# Patient Record
Sex: FEMALE | Race: OTHER
[De-identification: names, ages, dates, MRNs, and addresses within clinical notes are randomized per-mention and may not be internally consistent; named-entity substitution may affect disease eponyms.]

---

## 2018-05-11 PROBLEM — Z00.00 ENCOUNTER FOR PREVENTIVE HEALTH EXAMINATION: Status: ACTIVE | Noted: 2018-05-11

## 2018-05-14 ENCOUNTER — APPOINTMENT (OUTPATIENT)
Dept: SURGERY | Facility: CLINIC | Age: 63
End: 2018-05-14
Payer: MEDICAID

## 2018-05-14 VITALS
HEIGHT: 64 IN | BODY MASS INDEX: 25.27 KG/M2 | TEMPERATURE: 98.3 F | DIASTOLIC BLOOD PRESSURE: 84 MMHG | HEART RATE: 83 BPM | SYSTOLIC BLOOD PRESSURE: 146 MMHG | WEIGHT: 148 LBS

## 2018-05-14 PROCEDURE — 99203 OFFICE O/P NEW LOW 30 MIN: CPT

## 2018-05-25 ENCOUNTER — OUTPATIENT (OUTPATIENT)
Dept: OUTPATIENT SERVICES | Facility: HOSPITAL | Age: 63
LOS: 1 days | End: 2018-05-25
Payer: MEDICAID

## 2018-05-25 VITALS
SYSTOLIC BLOOD PRESSURE: 133 MMHG | DIASTOLIC BLOOD PRESSURE: 69 MMHG | OXYGEN SATURATION: 98 % | RESPIRATION RATE: 16 BRPM | HEART RATE: 68 BPM | WEIGHT: 145.06 LBS | HEIGHT: 62 IN | TEMPERATURE: 98 F

## 2018-05-25 DIAGNOSIS — D36.7 BENIGN NEOPLASM OF OTHER SPECIFIED SITES: ICD-10-CM

## 2018-05-25 DIAGNOSIS — N60.01 SOLITARY CYST OF RIGHT BREAST: ICD-10-CM

## 2018-05-25 DIAGNOSIS — Z01.818 ENCOUNTER FOR OTHER PREPROCEDURAL EXAMINATION: ICD-10-CM

## 2018-05-25 PROCEDURE — G0463: CPT

## 2018-05-25 NOTE — H&P PST ADULT - FAMILY HISTORY
Father  Still living? Unknown  Family history of pancreatitis, Age at diagnosis: Age Unknown     Sibling  Still living? Unknown  Family history of cancer, Age at diagnosis: Age Unknown

## 2018-05-25 NOTE — H&P PST ADULT - HISTORY OF PRESENT ILLNESS
62year old female with pmhx of endometriosis presents today with c/o painless right thigh and right breast mass x 1year. Patient denies any pain or fever to areas. Patient is here today for presurgical testing for scheduled excision of right breast mass and right thigh mass on 5/30/18

## 2018-05-29 ENCOUNTER — RESULT REVIEW (OUTPATIENT)
Age: 63
End: 2018-05-29

## 2018-05-29 ENCOUNTER — TRANSCRIPTION ENCOUNTER (OUTPATIENT)
Age: 63
End: 2018-05-29

## 2018-05-30 ENCOUNTER — OUTPATIENT (OUTPATIENT)
Dept: OUTPATIENT SERVICES | Facility: HOSPITAL | Age: 63
LOS: 1 days | End: 2018-05-30
Payer: MEDICAID

## 2018-05-30 VITALS
DIASTOLIC BLOOD PRESSURE: 69 MMHG | HEART RATE: 78 BPM | TEMPERATURE: 98 F | SYSTOLIC BLOOD PRESSURE: 133 MMHG | WEIGHT: 145.06 LBS | RESPIRATION RATE: 16 BRPM | OXYGEN SATURATION: 98 % | HEIGHT: 62 IN

## 2018-05-30 VITALS
OXYGEN SATURATION: 99 % | HEART RATE: 66 BPM | RESPIRATION RATE: 17 BRPM | DIASTOLIC BLOOD PRESSURE: 58 MMHG | TEMPERATURE: 98 F | SYSTOLIC BLOOD PRESSURE: 108 MMHG

## 2018-05-30 DIAGNOSIS — Z01.818 ENCOUNTER FOR OTHER PREPROCEDURAL EXAMINATION: ICD-10-CM

## 2018-05-30 DIAGNOSIS — D36.7 BENIGN NEOPLASM OF OTHER SPECIFIED SITES: ICD-10-CM

## 2018-05-30 DIAGNOSIS — N60.01 SOLITARY CYST OF RIGHT BREAST: ICD-10-CM

## 2018-05-30 PROCEDURE — 12032 INTMD RPR S/A/T/EXT 2.6-7.5: CPT

## 2018-05-30 PROCEDURE — 88305 TISSUE EXAM BY PATHOLOGIST: CPT | Mod: 26

## 2018-05-30 PROCEDURE — 88304 TISSUE EXAM BY PATHOLOGIST: CPT | Mod: 26

## 2018-05-30 PROCEDURE — 88305 TISSUE EXAM BY PATHOLOGIST: CPT

## 2018-05-30 PROCEDURE — 88304 TISSUE EXAM BY PATHOLOGIST: CPT

## 2018-05-30 PROCEDURE — 27327 EXC THIGH/KNEE LES SC < 3 CM: CPT | Mod: RT

## 2018-05-30 PROCEDURE — 19120 REMOVAL OF BREAST LESION: CPT | Mod: RT

## 2018-05-30 PROCEDURE — 11402 EXC TR-EXT B9+MARG 1.1-2 CM: CPT | Mod: XS

## 2018-05-30 RX ORDER — FENTANYL CITRATE 50 UG/ML
25 INJECTION INTRAVENOUS
Qty: 0 | Refills: 0 | Status: DISCONTINUED | OUTPATIENT
Start: 2018-05-30 | End: 2018-05-30

## 2018-05-30 RX ORDER — ONDANSETRON 8 MG/1
4 TABLET, FILM COATED ORAL ONCE
Qty: 0 | Refills: 0 | Status: DISCONTINUED | OUTPATIENT
Start: 2018-05-30 | End: 2018-05-30

## 2018-05-30 RX ORDER — SODIUM CHLORIDE 9 MG/ML
3 INJECTION INTRAMUSCULAR; INTRAVENOUS; SUBCUTANEOUS EVERY 8 HOURS
Qty: 0 | Refills: 0 | Status: DISCONTINUED | OUTPATIENT
Start: 2018-05-30 | End: 2018-05-30

## 2018-05-30 RX ORDER — ACETAMINOPHEN WITH CODEINE 300MG-30MG
1 TABLET ORAL
Qty: 10 | Refills: 0 | OUTPATIENT
Start: 2018-05-30

## 2018-05-30 RX ORDER — ACETAMINOPHEN WITH CODEINE 300MG-30MG
1 TABLET ORAL EVERY 6 HOURS
Qty: 0 | Refills: 0 | Status: DISCONTINUED | OUTPATIENT
Start: 2018-05-30 | End: 2018-05-30

## 2018-05-30 RX ADMIN — SODIUM CHLORIDE 3 MILLILITER(S): 9 INJECTION INTRAMUSCULAR; INTRAVENOUS; SUBCUTANEOUS at 08:17

## 2018-05-30 NOTE — ASU DISCHARGE PLAN (ADULT/PEDIATRIC). - NOTIFY
Fever greater than 101/Bleeding that does not stop/Swelling that continues Pain not relieved by Medications/Bleeding that does not stop/Fever greater than 101/Swelling that continues

## 2018-05-30 NOTE — ASU DISCHARGE PLAN (ADULT/PEDIATRIC). - MEDICATION SUMMARY - MEDICATIONS TO TAKE
I will START or STAY ON the medications listed below when I get home from the hospital:    acetaminophen-codeine 300 mg-30 mg oral tablet  -- 1 tab(s) by mouth every 6 hours, As needed, Moderate Pain (4 - 6) MDD:4  -- Indication: For Moderate pain

## 2018-05-30 NOTE — BRIEF OPERATIVE NOTE - PROCEDURE
<<-----Click on this checkbox to enter Procedure Excision of breast mass  05/30/2018    Active  [facility]  Excision of right thigh mass  05/30/2018    Active  [facility]

## 2018-05-30 NOTE — ASU DISCHARGE PLAN (ADULT/PEDIATRIC). - CONDITIONS AT DISCHARGE
pt feels comfortable. denies pain. No active bleeding. Tolerating diet. Vital signs stable. Dressing dry, clean, and intact. Fully awake, alert and oriented x 4 stable

## 2018-05-30 NOTE — BRIEF OPERATIVE NOTE - PRE-OP DX
Breast mass, right  05/30/2018    Active  Andrey Leroy  Mass of thigh, right  05/30/2018    Active  Andrey Leroy

## 2018-05-31 NOTE — BRIEF OPERATIVE NOTE - OPERATION/FINDINGS
Can add an extra vit B12 250mcg daily. rec she see neurology for her dizziness. No medicine can be given as they cause fatigue and she already has fatigue.   Wound class 1

## 2018-06-01 LAB — SURGICAL PATHOLOGY FINAL REPORT - CH: SIGNIFICANT CHANGE UP

## 2018-06-12 PROBLEM — Z87.42 HISTORY OF OVARIAN CYST: Status: RESOLVED | Noted: 2018-06-12 | Resolved: 2018-06-12

## 2018-06-12 PROBLEM — Z83.79 FAMILY HISTORY OF PANCREATITIS: Status: ACTIVE | Noted: 2018-06-12

## 2018-06-12 PROBLEM — R22.41 MASS OF RIGHT THIGH: Status: RESOLVED | Noted: 2018-06-12 | Resolved: 2018-06-12

## 2018-06-18 ENCOUNTER — APPOINTMENT (OUTPATIENT)
Dept: SURGERY | Facility: CLINIC | Age: 63
End: 2018-06-18
Payer: MEDICAID

## 2018-06-18 DIAGNOSIS — Z87.42 PERSONAL HISTORY OF OTHER DISEASES OF THE FEMALE GENITAL TRACT: ICD-10-CM

## 2018-06-18 DIAGNOSIS — R22.41 LOCALIZED SWELLING, MASS AND LUMP, RIGHT LOWER LIMB: ICD-10-CM

## 2018-06-18 DIAGNOSIS — Z83.79 FAMILY HISTORY OF OTHER DISEASES OF THE DIGESTIVE SYSTEM: ICD-10-CM

## 2018-06-18 DIAGNOSIS — R22.2 LOCALIZED SWELLING, MASS AND LUMP, TRUNK: ICD-10-CM

## 2018-06-18 PROCEDURE — 99024 POSTOP FOLLOW-UP VISIT: CPT

## 2020-04-02 PROBLEM — R22.2 CHEST WALL MASS: Status: RESOLVED | Noted: 2018-06-12 | Resolved: 2020-04-02

## 2021-01-04 PROBLEM — N80.9 ENDOMETRIOSIS, UNSPECIFIED: Chronic | Status: ACTIVE | Noted: 2018-05-25

## 2021-01-05 PROBLEM — Z12.39 SCREENING BREAST EXAMINATION: Status: ACTIVE | Noted: 2021-01-05

## 2021-01-05 NOTE — HISTORY OF PRESENT ILLNESS
[de-identified] : This is  a 65 year   old patient presenting today for a breast exam and to discuss the results of her recent  breast imaging. Patient had a    BL breast Mammogram on 10/19/2020 Deemed BIRADS category  1.   \par \par PERTINENT HISTORY: \par Menarche at age 12\par No pregnancies\par Menopause age 46. \par

## 2021-01-05 NOTE — DATA REVIEWED
[FreeTextEntry1] : Exam requested by:\par KP COCHRAN MD\par [address]\par [facility] [address]\par SITE PERFORMED: [facility] SITE PHONE: [phone]\par Patient: JUSTIN HILLIARD\par YOB: 1955\par Phone: [phone]\par MRN: [id number] Acc: [id number]\par Date of Exam: 10-\par  \par EXAM: DIGITAL BILATERAL SCREENING MAMMOGRAM WITH CAD AND TOMOSYNTHESIS\par \par HISTORY: The patient is 64 years old and is seen for a screening mammogram. There is no personal history of breast cancer. Family history of breast cancer: Patient's sister diagnosed at age 64.\par \par CLINICAL BREAST EXAMINATION: The patient states that she has not had a recent clinical breast exam. \par \par TECHNIQUE: The following views were obtained digitally: bilateral craniocaudal, bilateral mediolateral oblique. Low-dose full-field digital breast tomosynthesis examination was performed with 3D acquisitions and C-View synthesized 2D reconstructed images. Computer-aided detection (CAD) was utilized. \par \par COMPARISON: The present examination has been compared to prior breast imaging studies dating back to 6/23/2016.\par \par FINDINGS:\par BREAST COMPOSITION: The breasts are heterogeneously dense, which may obscure small masses.\par \par No suspicious masses, architectural distortion, or significant calcifications are detected.\par \par IMPRESSION: No mammographic evidence of malignancy. \par \par FOLLOW-UP: Annual screening.\par Screening breast ultrasound may be a helpful supplement to mammography in this patient with dense breast tissue. A screening breast ultrasound would require your prescription. \par \par ASSESSMENT: BI-RADS Category 1:  Negative.\par \par This examination should not preclude the clinical evaluation of a suspicious palpable abnormality. \par \par As per the FDA requirements, a layman's letter has been generated and sent to your patient stating the results and recommendations of this breast imaging study. We have entered your patient into our reminder system and will notify them when they are due for their next breast imaging exam. \par \par Thank you for the opportunity to participate in the care of this patient.  \par  \par Armando Rouse MD  - Electronically Signed: 10- 11:46 AM \par Physician to Physician Direct Line is: [phone]\par Copy to:JOY BE MD\par

## 2021-01-11 ENCOUNTER — APPOINTMENT (OUTPATIENT)
Dept: SURGERY | Facility: CLINIC | Age: 66
End: 2021-01-11

## 2021-01-11 DIAGNOSIS — Z12.39 ENCOUNTER FOR OTHER SCREENING FOR MALIGNANT NEOPLASM OF BREAST: ICD-10-CM

## 2024-01-09 ENCOUNTER — APPOINTMENT (OUTPATIENT)
Dept: OTOLARYNGOLOGY | Facility: CLINIC | Age: 69
End: 2024-01-09
Payer: MEDICARE

## 2024-01-09 VITALS — WEIGHT: 142 LBS | HEIGHT: 64 IN | BODY MASS INDEX: 24.24 KG/M2

## 2024-01-09 DIAGNOSIS — H90.3 SENSORINEURAL HEARING LOSS, BILATERAL: ICD-10-CM

## 2024-01-09 DIAGNOSIS — K21.9 GASTRO-ESOPHAGEAL REFLUX DISEASE W/OUT ESOPHAGITIS: ICD-10-CM

## 2024-01-09 DIAGNOSIS — H92.01 OTALGIA, RIGHT EAR: ICD-10-CM

## 2024-01-09 DIAGNOSIS — M26.609 UNSPECIFIED TEMPOROMANDIBULAR JOINT DISORDER: ICD-10-CM

## 2024-01-09 DIAGNOSIS — H69.91 UNSPECIFIED EUSTACHIAN TUBE DISORDER, RIGHT EAR: ICD-10-CM

## 2024-01-09 PROCEDURE — 31575 DIAGNOSTIC LARYNGOSCOPY: CPT

## 2024-01-09 PROCEDURE — 99204 OFFICE O/P NEW MOD 45 MIN: CPT | Mod: 25

## 2024-01-09 PROCEDURE — 92567 TYMPANOMETRY: CPT

## 2024-01-09 PROCEDURE — 92557 COMPREHENSIVE HEARING TEST: CPT

## 2024-01-09 NOTE — ASSESSMENT
[FreeTextEntry1] : She has a history of right ear pressure and pain.  She was found to have remodeling/defect in the right posterior canal wall. CT scan report was reviewed. There was no cholesteatoma. 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   Audiogram shows a bilateral SNHL  She has TMJD which is likely causing the ear pain. She may also have eustachian tube dysfunction, Flexible laryngoscopy showed laryngeal changes consistent with reflux. there were no lesions  Plan -findings and management options discussed with the patient. -good ear hygiene reviewed -avoid using cotton swabs in the ears -monitor hearing -consider hearing aid evaluation -reflux precautions. she was given literature -if she has nasal congestion, she may try a nasal steroid spray -treatment for TMJD recommended -we will monitor her ear closely. if she develops recurrent ear infections or there are changes in her exam, she may require surgery. However,  there is no obvious cholesteatoma on exam or on CT scan -follow up in 3 months if doing well.  -she was told to call and return earlier if any problems

## 2024-01-09 NOTE — PHYSICAL EXAM
[TextEntry] : PHYSICAL EXAM  General: The patient was alert, oriented and in no distress. Voice was clear.  Face: The patient had no facial asymmetry or mass. The skin was unremarkable.  Ears: Hearing normal to conversational voice External ears were normal without deformity. Ear canals: AS-clear. No cerumen or inflammation. AD- evaluated under the microscope. remodeling and scalloping of the posterior canal wall. no cerumen, squame, debris or inflammation.   Tympanic membranes were intact and normal. No perforation or effusion. mobile  Nose:  The external nose had no significant deformity.  There was no facial tenderness.  On anterior rhinoscopy, the nasal mucosa was normal.   The anterior septum was grossly midline. There were no visualized polyps, purulence  or masses.   Oral cavity: Oral mucosa- normal. Oral and base of tongue- clear and without mass. Gingival and buccal mucosa- moist and without lesions. Palate- the palate moved well. There was no cleft palate. There appeared to be good salivary flow.   Oral cavity/oropharynx- no pus, erythema or mass  Neck:  The neck was symmetrical. The parotid and submandibular glands were normal without masses. The trachea was midline and there was no unusual crepitus. Thyroid was smooth and nontender and no masses were palpated. No masses  Lymphatics: Cervical adenopathy- none.

## 2024-01-09 NOTE — HISTORY OF PRESENT ILLNESS
[de-identified] : JUSTIN HILLIARD is a 68 year old patient here for ear evaluation. She saw an ENT in December for right ear and neck pain.  She had wax removed from the ears and was given antibiotics. The ear and neck pain improved., She was also sent for a CT scan of the temporal bones.  She has a long history of right tinnitus. She has ear pressure/eustachian tube dysfunction but no pain, otorrhea or dizziness,  she has no throat pain, dysphagia, nasal congestion or sinus symptoms.   no history of ear surgery she had tonsillectomy as a child no recurrent ear infections no ear trauma or noise exposure she has TMJD related to trauma in the past. She is getting a new nightguard she does not smoke she has no seasonal allergies  CT scan of temporal bones- bony defect of the superior margin of the right external audiotory canal approximately 1 cm likely due to sequela of ear canal cholesteatoma. there was possible continuity with the mastoid air cells.

## 2024-01-10 PROBLEM — H90.3 SENSORINEURAL HEARING LOSS (SNHL) OF BOTH EARS: Status: ACTIVE | Noted: 2024-01-09

## 2024-02-06 ENCOUNTER — APPOINTMENT (OUTPATIENT)
Dept: OTOLARYNGOLOGY | Facility: CLINIC | Age: 69
End: 2024-02-06

## 2024-05-30 PROBLEM — R92.8 ABNORMAL MAMMOGRAPHY: Status: ACTIVE | Noted: 2024-05-30

## 2024-05-30 NOTE — DATA REVIEWED
[FreeTextEntry1] : 	   KP COCHRAN MD [address] [facility] [address] SITE PERFORMED: [place] SITE PHONE: [phone] Patient: JUSTIN HILLIARD YOB: 1955 Phone: [phone] MRN: [id number] Acc: [id number] Date of Exam: 12-   EXAM: DIGITAL BILATERAL SCREENING MAMMOGRAM WITH CAD AND TOMOSYNTHESIS  HISTORY: The patient is 68 years old and is seen for a screening mammogram.  Family history of breast cancer: Sister, 62 and sister, 66.  CLINICAL BREAST EXAMINATION: The patient does not state when she had her last clinical breast exam.   TECHNIQUE: The following views were obtained digitally: bilateral craniocaudal, bilateral mediolateral oblique. Low-dose full-field digital breast tomosynthesis examination was performed with tomosynthesis acquisitions and synthesized 2D reconstructed mammogram. Computer-aided detection was applied.  COMPARISON: The present examination has been compared to prior breast imaging studies dating back to 2019  FINDINGS: BREAST COMPOSITION: The breasts are heterogeneously dense, which may obscure small masses.  Right breast, lateral/posterior, loosely grouped calcifications. Left breast benign type calcifications. Neither breast contains adenopathy nor discrete mass.   IMPRESSION:  1. Right breast lateral calcifications for which diagnostic mammography is recommended. 2. Unremarkable left breast mammogram.   FOLLOW-UP: Additional imaging. A diagnostic right mammogram is recommended.  The Mammography Tracking Team will attempt to contact the patient to return for this additional imaging.  ASSESSMENT: BI-RADS Category 0:  Incomplete. Need additional imaging evaluation.  This examination should not preclude the clinical evaluation of a suspicious palpable abnormality.   As per the FDA requirements, a layman's letter has been generated and sent to your patient stating the results and recommendations of this breast imaging study. We have entered your patient into our reminder system and will notify them when they are due for their next breast imaging exam.   Thank you for the opportunity to participate in the care of this patient.     JING DRUMMOND MD  - Electronically Signed: 12- 12:57 PM  Physician to Physician Direct Line is: [phone]

## 2024-05-30 NOTE — HISTORY OF PRESENT ILLNESS
[de-identified] :   Patient had a BL  Mammogram on 12/13/2023.  Deemed BIRADS category 0.    PERTINENT HISTORY:  Menarche at age 12 No pregnancies Menopause age 46.

## 2024-06-06 ENCOUNTER — APPOINTMENT (OUTPATIENT)
Dept: SURGERY | Facility: CLINIC | Age: 69
End: 2024-06-06

## 2024-06-06 DIAGNOSIS — R92.8 OTHER ABNORMAL AND INCONCLUSIVE FINDINGS ON DIAGNOSTIC IMAGING OF BREAST: ICD-10-CM
